# Patient Record
Sex: FEMALE | Race: WHITE | NOT HISPANIC OR LATINO | Employment: OTHER | URBAN - METROPOLITAN AREA
[De-identification: names, ages, dates, MRNs, and addresses within clinical notes are randomized per-mention and may not be internally consistent; named-entity substitution may affect disease eponyms.]

---

## 2017-11-20 LAB — PHQ9 SCORE: 3

## 2017-12-18 ENCOUNTER — TRANSFERRED RECORDS (OUTPATIENT)
Dept: HEALTH INFORMATION MANAGEMENT | Facility: CLINIC | Age: 67
End: 2017-12-18

## 2017-12-22 ENCOUNTER — TRANSFERRED RECORDS (OUTPATIENT)
Dept: HEALTH INFORMATION MANAGEMENT | Facility: CLINIC | Age: 67
End: 2017-12-22

## 2018-01-10 ENCOUNTER — TRANSFERRED RECORDS (OUTPATIENT)
Dept: HEALTH INFORMATION MANAGEMENT | Facility: CLINIC | Age: 68
End: 2018-01-10

## 2018-01-17 ENCOUNTER — TRANSFERRED RECORDS (OUTPATIENT)
Dept: HEALTH INFORMATION MANAGEMENT | Facility: CLINIC | Age: 68
End: 2018-01-17

## 2018-01-30 ENCOUNTER — TRANSFERRED RECORDS (OUTPATIENT)
Dept: HEALTH INFORMATION MANAGEMENT | Facility: CLINIC | Age: 68
End: 2018-01-30

## 2018-02-16 ENCOUNTER — TRANSFERRED RECORDS (OUTPATIENT)
Dept: HEALTH INFORMATION MANAGEMENT | Facility: CLINIC | Age: 68
End: 2018-02-16

## 2018-06-26 LAB
HBA1C MFR BLD: 7.5 % (ref 4–5.6)
PHQ9 SCORE: 2

## 2019-01-22 ENCOUNTER — TRANSFERRED RECORDS (OUTPATIENT)
Dept: HEALTH INFORMATION MANAGEMENT | Facility: CLINIC | Age: 69
End: 2019-01-22

## 2019-01-22 LAB
ALBUMIN (URINE) MG/SPEC: 19 MG/L (ref 7–30)
ALBUMIN/CREATININE RATIO: 10 (ref 0–30)
CHOLEST SERPL-MCNC: 189 MG/DL (ref 25–200)
CREAT SERPL-MCNC: 0.77 MG/DL (ref 0.6–1.2)
CREATININE (URINE): 183 MG/DL (ref 9–259.5)
GFR SERPL CREATININE-BSD FRML MDRD: >60 ML/MIN/1.73M2
GLUCOSE SERPL-MCNC: 163 MG/DL (ref 70–99)
HBA1C MFR BLD: 7.7 % (ref 4–5.6)
HDLC SERPL-MCNC: 47 MG/DL
LDLC SERPL CALC-MCNC: 117 MG/DL (ref 0–100)
NONHDLC SERPL-MCNC: 142 MG/DL (ref 0–130)
PHQ9 SCORE: 0
POTASSIUM SERPL-SCNC: 4.5 MEQ/L (ref 3.5–5.1)
TRIGL SERPL-MCNC: 123 MG/DL (ref 0–150)

## 2019-04-23 ENCOUNTER — TRANSFERRED RECORDS (OUTPATIENT)
Dept: HEALTH INFORMATION MANAGEMENT | Facility: CLINIC | Age: 69
End: 2019-04-23

## 2019-04-23 LAB
CHOLEST SERPL-MCNC: 165 MG/DL (ref 25–200)
HBA1C MFR BLD: 7.4 % (ref 4–5.6)
HDLC SERPL-MCNC: 45 MG/DL
LDLC SERPL CALC-MCNC: 91 MG/DL (ref 0–100)
NONHDLC SERPL-MCNC: 120 MG/DL (ref 0–130)
PHQ9 SCORE: 2
TRIGL SERPL-MCNC: 143 MG/DL (ref 0–150)

## 2019-10-09 ENCOUNTER — OFFICE VISIT (OUTPATIENT)
Dept: FAMILY MEDICINE | Facility: CLINIC | Age: 69
End: 2019-10-09
Payer: COMMERCIAL

## 2019-10-09 VITALS
OXYGEN SATURATION: 92 % | SYSTOLIC BLOOD PRESSURE: 178 MMHG | BODY MASS INDEX: 35.34 KG/M2 | HEART RATE: 112 BPM | DIASTOLIC BLOOD PRESSURE: 98 MMHG | RESPIRATION RATE: 16 BRPM | HEIGHT: 61 IN | TEMPERATURE: 98.7 F | WEIGHT: 187.2 LBS

## 2019-10-09 DIAGNOSIS — Z23 NEED FOR PROPHYLACTIC VACCINATION AND INOCULATION AGAINST INFLUENZA: ICD-10-CM

## 2019-10-09 DIAGNOSIS — E66.01 MORBID OBESITY (H): ICD-10-CM

## 2019-10-09 DIAGNOSIS — E11.9 TYPE 2 DIABETES MELLITUS WITHOUT COMPLICATION, WITHOUT LONG-TERM CURRENT USE OF INSULIN (H): ICD-10-CM

## 2019-10-09 DIAGNOSIS — I10 HYPERTENSION, UNSPECIFIED TYPE: Primary | ICD-10-CM

## 2019-10-09 DIAGNOSIS — E78.5 HYPERLIPIDEMIA LDL GOAL <100: ICD-10-CM

## 2019-10-09 DIAGNOSIS — F33.0 MILD EPISODE OF RECURRENT MAJOR DEPRESSIVE DISORDER (H): ICD-10-CM

## 2019-10-09 DIAGNOSIS — L40.9 PSORIASIS: ICD-10-CM

## 2019-10-09 DIAGNOSIS — J96.01 ACUTE RESPIRATORY FAILURE WITH HYPOXIA (H): ICD-10-CM

## 2019-10-09 DIAGNOSIS — J44.9 CHRONIC OBSTRUCTIVE PULMONARY DISEASE, UNSPECIFIED COPD TYPE (H): ICD-10-CM

## 2019-10-09 LAB
ANION GAP SERPL CALCULATED.3IONS-SCNC: 6 MMOL/L (ref 3–14)
BUN SERPL-MCNC: 17 MG/DL (ref 7–30)
CALCIUM SERPL-MCNC: 9.6 MG/DL (ref 8.5–10.1)
CHLORIDE SERPL-SCNC: 100 MMOL/L (ref 94–109)
CO2 SERPL-SCNC: 31 MMOL/L (ref 20–32)
CREAT SERPL-MCNC: 0.63 MG/DL (ref 0.52–1.04)
CREAT UR-MCNC: 94 MG/DL
GFR SERPL CREATININE-BSD FRML MDRD: >90 ML/MIN/{1.73_M2}
GLUCOSE SERPL-MCNC: 114 MG/DL (ref 70–99)
HBA1C MFR BLD: 7.1 % (ref 0–5.6)
MICROALBUMIN UR-MCNC: 18 MG/L
MICROALBUMIN/CREAT UR: 19.14 MG/G CR (ref 0–25)
POTASSIUM SERPL-SCNC: 3.6 MMOL/L (ref 3.4–5.3)
SODIUM SERPL-SCNC: 137 MMOL/L (ref 133–144)

## 2019-10-09 PROCEDURE — G0008 ADMIN INFLUENZA VIRUS VAC: HCPCS | Performed by: FAMILY MEDICINE

## 2019-10-09 PROCEDURE — 80048 BASIC METABOLIC PNL TOTAL CA: CPT | Performed by: FAMILY MEDICINE

## 2019-10-09 PROCEDURE — 90662 IIV NO PRSV INCREASED AG IM: CPT | Performed by: FAMILY MEDICINE

## 2019-10-09 PROCEDURE — 99203 OFFICE O/P NEW LOW 30 MIN: CPT | Mod: 25 | Performed by: FAMILY MEDICINE

## 2019-10-09 PROCEDURE — 82043 UR ALBUMIN QUANTITATIVE: CPT | Performed by: FAMILY MEDICINE

## 2019-10-09 PROCEDURE — 36415 COLL VENOUS BLD VENIPUNCTURE: CPT | Performed by: FAMILY MEDICINE

## 2019-10-09 PROCEDURE — 83036 HEMOGLOBIN GLYCOSYLATED A1C: CPT | Performed by: FAMILY MEDICINE

## 2019-10-09 RX ORDER — METFORMIN HCL 500 MG
500 TABLET, EXTENDED RELEASE 24 HR ORAL
Qty: 90 TABLET | Refills: 3 | Status: SHIPPED | OUTPATIENT
Start: 2019-10-09 | End: 2020-09-29

## 2019-10-09 RX ORDER — LISINOPRIL 10 MG/1
10 TABLET ORAL DAILY
COMMUNITY
End: 2019-10-09

## 2019-10-09 RX ORDER — NITROGLYCERIN 0.4 MG/1
0.4 TABLET SUBLINGUAL EVERY 5 MIN PRN
COMMUNITY

## 2019-10-09 RX ORDER — ATORVASTATIN CALCIUM 40 MG/1
40 TABLET, FILM COATED ORAL DAILY
COMMUNITY
End: 2019-10-09

## 2019-10-09 RX ORDER — FLUOCINONIDE TOPICAL SOLUTION USP, 0.05% 0.5 MG/ML
SOLUTION TOPICAL DAILY
Qty: 120 ML | Refills: 3 | Status: SHIPPED | OUTPATIENT
Start: 2019-10-09 | End: 2020-12-11

## 2019-10-09 RX ORDER — CHOLECALCIFEROL (VITAMIN D3) 50 MCG
1 TABLET ORAL DAILY
COMMUNITY

## 2019-10-09 RX ORDER — METOPROLOL SUCCINATE 25 MG/1
25 TABLET, EXTENDED RELEASE ORAL DAILY
COMMUNITY
End: 2019-10-09

## 2019-10-09 RX ORDER — DILTIAZEM HYDROCHLORIDE 120 MG/1
120 CAPSULE, COATED, EXTENDED RELEASE ORAL DAILY
Qty: 90 CAPSULE | Refills: 3 | Status: SHIPPED | OUTPATIENT
Start: 2019-10-09 | End: 2020-10-09

## 2019-10-09 RX ORDER — METOPROLOL SUCCINATE 25 MG/1
25 TABLET, EXTENDED RELEASE ORAL DAILY
Qty: 90 TABLET | Refills: 3 | Status: SHIPPED | OUTPATIENT
Start: 2019-10-09 | End: 2020-11-03

## 2019-10-09 RX ORDER — ATORVASTATIN CALCIUM 40 MG/1
40 TABLET, FILM COATED ORAL DAILY
Qty: 90 TABLET | Refills: 3 | Status: SHIPPED | OUTPATIENT
Start: 2019-10-09 | End: 2020-11-17

## 2019-10-09 RX ORDER — LISINOPRIL 10 MG/1
10 TABLET ORAL DAILY
Qty: 90 TABLET | Refills: 3 | Status: SHIPPED | OUTPATIENT
Start: 2019-10-09 | End: 2020-11-03

## 2019-10-09 RX ORDER — ALBUTEROL SULFATE 90 UG/1
2 AEROSOL, METERED RESPIRATORY (INHALATION) EVERY 6 HOURS PRN
COMMUNITY
End: 2019-10-09

## 2019-10-09 RX ORDER — DILTIAZEM HYDROCHLORIDE 120 MG/1
120 CAPSULE, COATED, EXTENDED RELEASE ORAL DAILY
COMMUNITY
End: 2019-10-09

## 2019-10-09 RX ORDER — ALBUTEROL SULFATE 0.83 MG/ML
2.5 SOLUTION RESPIRATORY (INHALATION)
Qty: 360 ML | Refills: 6 | Status: SHIPPED | OUTPATIENT
Start: 2019-10-09 | End: 2020-11-17

## 2019-10-09 RX ORDER — FLUOCINONIDE TOPICAL SOLUTION USP, 0.05% 0.5 MG/ML
SOLUTION TOPICAL
COMMUNITY
End: 2019-10-09

## 2019-10-09 RX ORDER — ALBUTEROL SULFATE 90 UG/1
2 AEROSOL, METERED RESPIRATORY (INHALATION) EVERY 6 HOURS PRN
Qty: 1 INHALER | Refills: 11 | Status: SHIPPED | OUTPATIENT
Start: 2019-10-09 | End: 2020-11-11

## 2019-10-09 ASSESSMENT — MIFFLIN-ST. JEOR: SCORE: 1303.57

## 2019-10-09 NOTE — PATIENT INSTRUCTIONS
Thank you for choosing [facility].  You may be receiving an email and/or telephone survey request from [facility] [employer] regarding your visit today.  Please take a few minutes to respond to the survey to let us know how we are doing.      If you have questions or concerns, please contact us via [employer] or you can contact your care team at [phone].    Our Clinic hours are:  Monday 6:40 am  to 7:00 pm  Tuesday -Friday 6:40 am to 5:00 pm    The [place] outpatient lab hours are:  Monday - Friday 6:10 am to 4:45 pm  Saturdays 7:00 am to 11:00 am  Appointments are required, call [phone]    If you have clinical questions after hours or would like to schedule an appointment,  call the clinic at [phone].

## 2019-10-09 NOTE — PROGRESS NOTES
Subjective     Leena Puente is a 69 year old female who presents to clinic today for the following health issues:    Patient is a 69 yr old female here to [facility]. She has a past medical history that is significant for hypertension, diabetes ,hyperlipidemia obesity, she is transferring her care from [place] to here.denies any acute symptoms and will like to have all her medication refilled.  Chief Complaint   Patient presents with     [facility]     NEW PATIENT TRANSFERRING CARE FROM [facility] ,[facility] \ , RENEW ALL MEDS,  3 months Supply x 1 yr.     Diabetes     DUE FOR ALL LABS, SHE IS FASTING      Imm/Inj     Flu Shot       New Patient/Transfer of Care- Transferring from -  [facility]     Diabetes Follow-up      How often are you checking your blood sugar? One time daily    What time of day are you checking your blood sugars (select all that apply)?  Before meals    Have you had any blood sugars above 200?  No    Have you had any blood sugars below 70?  No    What symptoms do you notice when your blood sugar is low?  Other: sweating and nausea     What concerns do you have today about your diabetes? Other: Weight      Do you have any of these symptoms? (Select all that apply)  Blurry vision and Weight gain - cataracts, Always humgry , gaining weight      Have you had a diabetic eye exam in the last 12 months? Yes- Date of last eye exam: 6 months ago - [facility]     Diabetes Management Resources    Hyperlipidemia Follow-Up      Are you having any of the following symptoms? (Select all that apply)  Shortness of breath    Are you regularly taking any medication or supplement to lower your cholesterol?   Yes- Atorvastatin    Are you having muscle aches or other side effects that you think could be caused by your cholesterol lowering medication?  No    Hypertension Follow-up      Do you check your blood pressure regularly outside of the clinic? Yes     Are you following a low salt diet? No    Are your  blood pressures ever more than 140 on the top number (systolic) OR more   than 90 on the bottom number (diastolic), for example 140/90? Yes    BP Readings from Last 2 Encounters:   10/11/19 132/70   10/09/19 (!) 178/98     Hemoglobin A1C (%)   Date Value   10/09/2019 7.1 (H)   2015 7.0 (H)         How many servings of fruits and vegetables do you eat daily?  2-3    On average, how many sweetened beverages do you drink each day (soda, juice, sweet tea, etc)?   0    How many days per week do you miss taking your medication? 0            Patient Active Problem List   Diagnosis     Acute respiratory disease     Obesity     Tobacco abuse     HTN (hypertension)     Transaminitis     Altered mental status     Acute respiratory failure with hypoxia (H)     Hyperlipidemia     COPD (chronic obstructive pulmonary disease) (H)     Diabetes mellitus, type 2 (H)     Obesity (BMI 35.0-39.9) with comorbidity (H)     History reviewed. No pertinent surgical history.    Social History     Tobacco Use     Smoking status: Former Smoker     Packs/day: 0.00     Last attempt to quit: 10/9/2014     Years since quittin.0     Smokeless tobacco: Never Used   Substance Use Topics     Alcohol use: Not Currently     Family History   Problem Relation Age of Onset     Diabetes Mother      Hypertension Mother      Heart Surgery Mother         QUAD BYPASS     Diabetes Father      Hypertension Father          Current Outpatient Medications   Medication Sig Dispense Refill     albuterol (PROAIR HFA/PROVENTIL HFA/VENTOLIN HFA) 108 (90 Base) MCG/ACT inhaler Inhale 2 puffs into the lungs every 6 hours as needed for shortness of breath / dyspnea or wheezing 1 Inhaler 11     albuterol (PROVENTIL) (2.5 MG/3ML) 0.083% neb solution Take 1 vial (2.5 mg) by nebulization every 2 hours as needed for shortness of breath / dyspnea or other (dyspnea) 360 mL 6     aspirin 81 MG tablet Take 81 mg by mouth daily       atorvastatin (LIPITOR) 40 MG tablet Take 1  tablet (40 mg) by mouth daily 90 tablet 3     Calcium Carbonate Antacid (TUMS ULTRA 1000 PO) Take 1 tablet by mouth daily       Cholecalciferol (VITAMIN D3) 2000 units TABS Take 1 tablet by mouth daily       diltiazem ER COATED BEADS (CARDIZEM CD/CARTIA XT) 120 MG 24 hr capsule Take 1 capsule (120 mg) by mouth daily 90 capsule 3     fluocinonide (LIDEX) 0.05 % external solution Apply topically daily AS NEEDED 120 mL 3     fluticasone-salmeterol (ADVAIR) 500-50 MCG/DOSE inhaler Inhale 1 puff into the lungs 2 times daily 1 Inhaler 11     lisinopril (PRINIVIL/ZESTRIL) 10 MG tablet Take 1 tablet (10 mg) by mouth daily 90 tablet 3     metFORMIN (GLUCOPHAGE-XR) 500 MG 24 hr tablet Take 1 tablet (500 mg) by mouth daily (with dinner) Please fill at patient request 90 tablet 3     metoprolol succinate ER (TOPROL-XL) 25 MG 24 hr tablet Take 1 tablet (25 mg) by mouth daily 90 tablet 3     nitroGLYcerin (NITROSTAT) 0.4 MG sublingual tablet Place 0.4 mg under the tongue every 5 minutes as needed for chest pain For chest pain place 1 tablet under the tongue every 5 minutes for 3 doses. If symptoms persist 5 minutes after 1st dose call 911.       sertraline (ZOLOFT) 50 MG tablet Take 1 tablet (50 mg) by mouth daily 90 tablet 3     umeclidinium (INCRUSE ELLIPTA) 62.5 MCG/INH inhaler Inhale 1 puff into the lungs daily 1 Inhaler 11     Allergies   Allergen Reactions     Latex      HIVES     Sulfa Drugs Hives     BP Readings from Last 3 Encounters:   10/11/19 132/70   10/09/19 (!) 178/98   12/27/16 157/82    Wt Readings from Last 3 Encounters:   10/09/19 84.9 kg (187 lb 3.2 oz)   01/14/15 73 kg (161 lb)                      Reviewed and updated as needed this visit by Provider  Tobacco  Allergies  Meds  Problems  Med Hx  Surg Hx  Fam Hx         Review of Systems   ROS COMP: Constitutional, HEENT, cardiovascular, pulmonary, gi and gu systems are negative, except as otherwise noted.      Objective    BP (!) 178/98 (BP Location:  "Right arm, Patient Position: Chair, Cuff Size: Adult Large)   Pulse 112   Temp 98.7  F (37.1  C) (Tympanic)   Resp 16   Ht 1.537 m (5' 0.5\")   Wt 84.9 kg (187 lb 3.2 oz)   SpO2 92%   Breastfeeding? No   BMI 35.96 kg/m    Body mass index is 35.96 kg/m .  Physical Exam   GENERAL: healthy, alert and no distress  EYES: Eyes grossly normal to inspection, PERRL and conjunctivae and sclerae normal  HENT: ear canals and TM's normal, nose and mouth without ulcers or lesions  NECK: no adenopathy, no asymmetry, masses, or scars and thyroid normal to palpation  RESP: lungs clear to auscultation - no rales, rhonchi or wheezes  CV: regular rate and rhythm, normal S1 S2, no S3 or S4, no murmur, click or rub, no peripheral edema and peripheral pulses strong  ABDOMEN: soft, nontender, no hepatosplenomegaly, no masses and bowel sounds normal  MS: no gross musculoskeletal defects noted, no edema    Diagnostic Test Results:  Labs reviewed in Epic  Results for orders placed or performed in visit on 10/09/19   Hemoglobin A1c   Result Value Ref Range    Hemoglobin A1C 7.1 (H) 0 - 5.6 %   Basic metabolic panel   Result Value Ref Range    Sodium 137 133 - 144 mmol/L    Potassium 3.6 3.4 - 5.3 mmol/L    Chloride 100 94 - 109 mmol/L    Carbon Dioxide 31 20 - 32 mmol/L    Anion Gap 6 3 - 14 mmol/L    Glucose 114 (H) 70 - 99 mg/dL    Urea Nitrogen 17 7 - 30 mg/dL    Creatinine 0.63 0.52 - 1.04 mg/dL    GFR Estimate >90 >60 mL/min/[1.73_m2]    GFR Estimate If Black >90 >60 mL/min/[1.73_m2]    Calcium 9.6 8.5 - 10.1 mg/dL   Albumin Random Urine Quantitative with Creat Ratio   Result Value Ref Range    Creatinine Urine 94 mg/dL    Albumin Urine mg/L 18 mg/L    Albumin Urine mg/g Cr 19.14 0 - 25 mg/g Cr           Assessment & Plan     (I10) Hypertension, unspecified type  (primary encounter diagnosis)  Comment: blood pressure was high today.  Patient will return for a nurse only visit to recheck the blood pressure.   Plan: diltiazem ER " COATED BEADS (CARDIZEM CD/CARTIA         XT) 120 MG 24 hr capsule, lisinopril         (PRINIVIL/ZESTRIL) 10 MG tablet, metoprolol         succinate ER (TOPROL-XL) 25 MG 24 hr tablet       (E11.9) Type 2 diabetes mellitus without complication, without long-term current use of insulin (H)  Comment: A1c is 7.1 . This is a fair in range. Medication refilled.   Plan: Hemoglobin A1c, metFORMIN (GLUCOPHAGE-XR) 500         MG 24 hr tablet, Basic metabolic panel, Albumin        Random Urine Quantitative with Creat Ratio      (L40.9) Psoriasis  Comment: medication refilled.   Plan: fluocinonide (LIDEX) 0.05 % external solution            (F33.0) Mild episode of recurrent major depressive disorder (H)  Comment: Medication refilled  Plan: sertraline (ZOLOFT) 50 MG tablet       (J44.9) Chronic obstructive pulmonary disease, unspecified COPD type (H)  Comment: Medication is refilled.  Plan: albuterol (PROVENTIL) (2.5 MG/3ML) 0.083% neb         solution            (E78.5) Hyperlipidemia LDL goal <100  Comment: Lipids lab ordered .   Plan: atorvastatin (LIPITOR) 40 MG tablet, diltiazem         ER COATED BEADS (CARDIZEM CD/CARTIA XT) 120 MG         24 hr capsule      (E66.01) Morbid obesity (H)  Comment: working on diet and exercise  Plan: As above     (Z23) Need for prophylactic vaccination and inoculation against influenza  Comment:flu shot given   Plan: INFLUENZA (HIGH DOSE) 3 VALENT VACCINE [76618],        ADMIN INFLUENZA (For MEDICARE Patients ONLY)         []          (J96.01) Acute respiratory failure with hypoxia (H)  Comment: resolved   Plan: as above              FUTURE APPOINTMENTS:       - Follow-up visit in one month or sooner as needed.  Patient Instructions         Thank you for choosing [facility].  You may be receiving an email and/or telephone survey request from [employer] regarding your visit today.  Please take a few minutes to respond to the survey to let us know how we are  doing.      If you have questions or concerns, please contact us via [employer] or you can contact your care team at [phone].    Our Clinic hours are:  Monday 6:40 am  to 7:00 pm  Tuesday -Friday 6:40 am to 5:00 pm    The [place] outpatient lab hours are:  Monday - Friday 6:10 am to 4:45 pm  Saturdays 7:00 am to 11:00 am  Appointments are required, call [phone]    If you have clinical questions after hours or would like to schedule an appointment,  call the clinic at [phone].        Return in about 6 months (around 4/9/2020) for Routine Visit.    Pamella Ga MD  [facility]

## 2019-10-10 NOTE — RESULT ENCOUNTER NOTE
Please inform patient that test result was within normal parameters.   Thank you.     Pamella Ga M.D.

## 2019-10-11 ENCOUNTER — TELEPHONE (OUTPATIENT)
Dept: FAMILY MEDICINE | Facility: CLINIC | Age: 69
End: 2019-10-11

## 2019-10-11 ENCOUNTER — ALLIED HEALTH/NURSE VISIT (OUTPATIENT)
Dept: FAMILY MEDICINE | Facility: CLINIC | Age: 69
End: 2019-10-11
Payer: COMMERCIAL

## 2019-10-11 VITALS — DIASTOLIC BLOOD PRESSURE: 70 MMHG | HEART RATE: 105 BPM | SYSTOLIC BLOOD PRESSURE: 132 MMHG

## 2019-10-11 DIAGNOSIS — Z01.30 BP CHECK: Primary | ICD-10-CM

## 2019-10-11 PROBLEM — E66.01 MORBID OBESITY (H): Status: ACTIVE | Noted: 2019-10-11

## 2019-10-11 PROBLEM — E11.9 DIABETES MELLITUS, TYPE 2 (H): Status: ACTIVE | Noted: 2019-10-11

## 2019-10-11 PROBLEM — J44.9 COPD (CHRONIC OBSTRUCTIVE PULMONARY DISEASE) (H): Status: ACTIVE | Noted: 2019-10-11

## 2019-10-11 PROCEDURE — 99207 ZZC NO CHARGE NURSE ONLY: CPT

## 2019-10-11 NOTE — TELEPHONE ENCOUNTER
S-(situation): BP Check    B-(background): 10/09/19 Sury, provider's note not available at time of visit. BP was elevated.  BP Readings from Last 3 Encounters:   10/09/19 (!) 178/98   12/27/16 157/82   01/14/15 95/59     A-(assessment):   Vital Signs 10/11/2019   Systolic 132   Diastolic 70   Pulse 105   Manual.    Patient reports being nervous and has been thinking about having to recheck BP today. First checked with regular sized cuff and BP was 156/74. Discussed need for larger cuff as the regular size was a little too small. Also practiced deep breathing prior to BP Check.     Pharmacy: [facility]    R-(recommendations): Routing to provider to update.      MIRIAM KulkarniN, RN

## 2019-10-11 NOTE — PROGRESS NOTES
S-(situation): BP Check    B-(background): 10/09/19 Sury, provider's note not available at time of visit. BP was elevated.  BP Readings from Last 3 Encounters:   10/09/19 (!) 178/98   12/27/16 157/82   01/14/15 95/59     A-(assessment):   Vital Signs 10/11/2019   Systolic 132   Diastolic 70   Pulse 105   Manual.    Patient reports being nervous and has been thinking about having to recheck BP today. First checked with regular sized cuff and BP was 156/74. Discussed need for larger cuff as the regular size was a little too small. Also practiced deep breathing prior to BP Check.     Pharmacy: [facility]    R-(recommendations): Routing to provider to update.      MIRIAM KulkarniN, RN

## 2019-10-15 PROBLEM — F33.0 MILD EPISODE OF RECURRENT MAJOR DEPRESSIVE DISORDER (H): Status: ACTIVE | Noted: 2019-10-15

## 2020-03-10 ENCOUNTER — HEALTH MAINTENANCE LETTER (OUTPATIENT)
Age: 70
End: 2020-03-10

## 2020-09-29 DIAGNOSIS — E11.9 TYPE 2 DIABETES MELLITUS WITHOUT COMPLICATION, WITHOUT LONG-TERM CURRENT USE OF INSULIN (H): ICD-10-CM

## 2020-09-29 RX ORDER — METFORMIN HCL 500 MG
500 TABLET, EXTENDED RELEASE 24 HR ORAL
Qty: 90 TABLET | Refills: 0 | Status: SHIPPED | OUTPATIENT
Start: 2020-09-29 | End: 2020-11-17

## 2020-10-08 ENCOUNTER — TELEPHONE (OUTPATIENT)
Dept: FAMILY MEDICINE | Facility: CLINIC | Age: 70
End: 2020-10-08

## 2020-10-08 DIAGNOSIS — E78.5 HYPERLIPIDEMIA LDL GOAL <100: ICD-10-CM

## 2020-10-08 DIAGNOSIS — I10 HYPERTENSION, UNSPECIFIED TYPE: ICD-10-CM

## 2020-10-08 NOTE — TELEPHONE ENCOUNTER
Pending Prescriptions:                       Disp   Refills    diltiazem ER COATED BEADS (CARDIZEM CD/CA*90 cap*3            Sig: Take 1 capsule (120 mg) by mouth daily    [facility] - [place], [place] - [address] [place], [place] [address]lila HALL [facility] on 10/8/2020 at 12:03 PM

## 2020-10-08 NOTE — LETTER
[facility]  [address] [address]  Phone: [phone]      October 9, 2020    Leena Puente     [place] [facility] [address]                        Dear Leena Puente    We have received a refill request from your pharmacy, however, we were only able to provide a one time fill because you are due for an Office visit and labs.     Please call [phone]  to schedule an appointment before you are due for your next refill.        Sincerely,    [facility]

## 2020-10-09 RX ORDER — DILTIAZEM HYDROCHLORIDE 120 MG/1
120 CAPSULE, COATED, EXTENDED RELEASE ORAL DAILY
Qty: 30 CAPSULE | Refills: 0 | Status: SHIPPED | OUTPATIENT
Start: 2020-10-09 | End: 2020-11-11

## 2020-10-09 NOTE — TELEPHONE ENCOUNTER
"Requested Prescriptions   Pending Prescriptions Disp Refills     diltiazem ER COATED BEADS (CARDIZEM CD/CARTIA XT) 120 MG 24 hr capsule 90 capsule 3     Sig: Take 1 capsule (120 mg) by mouth daily       Calcium Channel Blockers Protocol  Failed - 10/8/2020 12:03 PM        Failed - Normal ALT in past 12 months     Recent Labs   Lab Test 01/14/15  0656   ALT 46             Passed - Blood pressure under 140/90 in past 12 months     BP Readings from Last 3 Encounters:   10/11/19 132/70   10/09/19 (!) 178/98   12/27/16 157/82                 Passed - Recent (12 mo) or future (30 days) visit within the authorizing provider's specialty     Patient has had an office visit with the authorizing provider or a provider within the authorizing providers department within the previous 12 mos or has a future within next 30 days. See \"Patient Info\" tab in inbasket, or \"Choose Columns\" in Meds & Orders section of the refill encounter.              Passed - Medication is active on med list        Passed - Patient is age 18 or older        Passed - No active pregnancy on record        Passed - Normal serum creatinine on file in past 12 months     Recent Labs   Lab Test 10/09/19  1127   CR 0.63       Ok to refill medication if creatinine is low          Passed - No positive pregnancy test in past 12 months           Labs not current:  ALT    Medication is being filled for 1 time refill only due to:  Patient needs labs ALT. Patient needs to be seen because it has been more than one year since last visit.     Sent message with script to pharmacy.             "

## 2020-11-02 DIAGNOSIS — I10 HYPERTENSION, UNSPECIFIED TYPE: ICD-10-CM

## 2020-11-02 NOTE — TELEPHONE ENCOUNTER
"Requested Prescriptions   Pending Prescriptions Disp Refills     metoprolol succinate ER (TOPROL-XL) 25 MG 24 hr tablet [Pharmacy Med Name: Metoprolol Succinate ER 25 MG Oral Tablet Extended Release 24 Hour] 90 tablet 0     Sig: Take 1 tablet by mouth once daily       Beta-Blockers Protocol Failed - 11/2/2020  9:05 AM        Failed - Blood pressure under 140/90 in past 12 months     BP Readings from Last 3 Encounters:   10/11/19 132/70   10/09/19 (!) 178/98 12/27/16 157/82                 Passed - Patient is age 6 or older        Passed - Recent (12 mo) or future (30 days) visit within the authorizing provider's specialty     Patient has had an office visit with the authorizing provider or a provider within the authorizing providers department within the previous 12 mos or has a future within next 30 days. See \"Patient Info\" tab in inbasket, or \"Choose Columns\" in Meds & Orders section of the refill encounter.              Passed - Medication is active on med list           lisinopril (ZESTRIL) 10 MG tablet [Pharmacy Med Name: Lisinopril 10 MG Oral Tablet] 90 tablet 0     Sig: Take 1 tablet by mouth once daily       ACE Inhibitors (Including Combos) Protocol Failed - 11/2/2020  9:05 AM        Failed - Blood pressure under 140/90 in past 12 months     BP Readings from Last 3 Encounters:   10/11/19 132/70   10/09/19 (!) 178/98   12/27/16 157/82                 Failed - Normal serum creatinine on file in past 12 months     Recent Labs   Lab Test 10/09/19  1127   CR 0.63       Ok to refill medication if creatinine is low          Failed - Normal serum potassium on file in past 12 months     Recent Labs   Lab Test 10/09/19  1127   POTASSIUM 3.6             Passed - Recent (12 mo) or future (30 days) visit within the authorizing provider's specialty     Patient has had an office visit with the authorizing provider or a provider within the authorizing providers department within the previous 12 mos or has a future within " "next 30 days. See \"Patient Info\" tab in inbasket, or \"Choose Columns\" in Meds & Orders section of the refill encounter.              Passed - Medication is active on med list        Passed - Patient is age 18 or older        Passed - No active pregnancy on record        Passed - No positive pregnancy test within past 12 months             "

## 2020-11-02 NOTE — LETTER
[facility]  [address] [address]  [phone]    November 3, 2020    Leena Puente                                                                                                                        [facility] [address]                  Dear Leena,    We have received a refill request from your pharmacy, however, we were only able to provide a one time fill because you are Overdue for Office visit and labs.     Please call [phone] to schedule an appointment before you are due for your next refill.        Sincerely,       Pamella Ga MD

## 2020-11-03 RX ORDER — LISINOPRIL 10 MG/1
TABLET ORAL
Qty: 30 TABLET | Refills: 0 | Status: SHIPPED | OUTPATIENT
Start: 2020-11-03 | End: 2020-11-17

## 2020-11-03 RX ORDER — METOPROLOL SUCCINATE 25 MG/1
TABLET, EXTENDED RELEASE ORAL
Qty: 30 TABLET | Refills: 0 | Status: SHIPPED | OUTPATIENT
Start: 2020-11-03 | End: 2020-11-17

## 2020-11-03 NOTE — TELEPHONE ENCOUNTER
Patient called stating that she only has one day left of medication, requesting refill.     Ree MEHTA  [facility]

## 2020-11-03 NOTE — TELEPHONE ENCOUNTER
Medication is being filled for 1 time refill only due to:  Patient needs labs CMP. Patient needs to be seen because it has been more than one year since last visit.     Sent message with script to pharmacy and mailed letter to home.

## 2020-11-10 DIAGNOSIS — I10 HYPERTENSION, UNSPECIFIED TYPE: ICD-10-CM

## 2020-11-10 DIAGNOSIS — J44.9 CHRONIC OBSTRUCTIVE PULMONARY DISEASE, UNSPECIFIED COPD TYPE (H): Primary | ICD-10-CM

## 2020-11-10 DIAGNOSIS — E78.5 HYPERLIPIDEMIA LDL GOAL <100: ICD-10-CM

## 2020-11-10 NOTE — TELEPHONE ENCOUNTER
"Requested Prescriptions   Pending Prescriptions Disp Refills     VENTOLIN  (90 Base) MCG/ACT inhaler [Pharmacy Med Name: Ventolin  (90 Base) MCG/ACT Inhalation Aerosol Solution] 18 g 0     Sig: INHALE 2 PUFFS BY MOUTH EVERY 6 HOURS AS NEEDED FOR SHORTNESS OF BREATH AND FOR WHEEZING       Asthma Maintenance Inhalers - Anticholinergics Passed - 11/10/2020 12:40 PM        Passed - Patient is age 12 years or older        Passed - Recent (12 mo) or future (30 days) visit within the authorizing provider's specialty     Patient has had an office visit with the authorizing provider or a provider within the authorizing providers department within the previous 12 mos or has a future within next 30 days. See \"Patient Info\" tab in inbasket, or \"Choose Columns\" in Meds & Orders section of the refill encounter.              Passed - Medication is active on med list       Short-Acting Beta Agonist Inhalers Protocol  Passed - 11/10/2020 12:40 PM        Passed - Patient is age 12 or older        Passed - Recent (12 mo) or future (30 days) visit within the authorizing provider's specialty     Patient has had an office visit with the authorizing provider or a provider within the authorizing providers department within the previous 12 mos or has a future within next 30 days. See \"Patient Info\" tab in inbasket, or \"Choose Columns\" in Meds & Orders section of the refill encounter.              Passed - Medication is active on med list           INCRUSE ELLIPTA 62.5 MCG/INH Inhaler [Pharmacy Med Name: Incruse Ellipta 62.5 MCG/INH Inhalation Aerosol Powder Breath Activated]  0     Sig: Inhale 1 puff by mouth once daily       Asthma Maintenance Inhalers - Anticholinergics Passed - 11/10/2020 12:40 PM        Passed - Patient is age 12 years or older        Passed - Recent (12 mo) or future (30 days) visit within the authorizing provider's specialty     Patient has had an office visit with the authorizing provider or a provider " "within the authorizing providers department within the previous 12 mos or has a future within next 30 days. See \"Patient Info\" tab in inbasket, or \"Choose Columns\" in Meds & Orders section of the refill encounter.              Passed - Medication is active on med list             "

## 2020-11-11 RX ORDER — ALBUTEROL SULFATE 90 UG/1
AEROSOL, METERED RESPIRATORY (INHALATION)
Qty: 18 G | Refills: 0 | Status: SHIPPED | OUTPATIENT
Start: 2020-11-11 | End: 2020-11-17

## 2020-11-11 RX ORDER — DILTIAZEM HYDROCHLORIDE 120 MG/1
120 CAPSULE, COATED, EXTENDED RELEASE ORAL DAILY
Qty: 30 CAPSULE | Refills: 0 | Status: SHIPPED | OUTPATIENT
Start: 2020-11-11 | End: 2020-11-17

## 2020-11-12 DIAGNOSIS — J44.9 OBSTRUCTIVE LUNG DISEASE (H): Primary | ICD-10-CM

## 2020-11-16 NOTE — PROGRESS NOTES
SUBJECTIVE:                                                    Leena Puente is 70 year old female   Chief Complaint   Patient presents with     Diabetes     Hypertension     No BP medication this morning.     Lipids     Patient is a 70-year-old female with past medical history significant for hypertension, COPD, morbid obesity, type 2 diabetes, psoriasis.  She comes in today for recheck of her chronic medical conditions.  Her A1c was much elevated compared to the last check.  A1c today is 7.9 as compared to 7.1.  Patient did admit that she had not  been compliant with her diet and she has been doing a lot of stress eating.    Denies any side effects to her medications.  Her blood pressure and heart rate also very high today.  She admitted that she had not taken her medication this morning.  She denies any other acute symptoms today.  She is requesting refills on all her medications.      Diabetes Follow-up    How often are you checking your blood sugar? Two times daily  Blood sugar testing frequency justification:  Patient modifying lifestyle changes (diet, exercise) with blood sugars  What time of day are you checking your blood sugars (select all that apply)?  Before meals  Have you had any blood sugars above 200?  No  Have you had any blood sugars below 70?  No    What symptoms do you notice when your blood sugar is low?  None and agitated, sweaty.    What concerns do you have today about your diabetes? None     Do you have any of these symptoms? (Select all that apply)  No numbness or tingling in feet.  No redness, sores or blisters on feet.  No complaints of excessive thirst.  No reports of blurry vision.  No significant changes to weight.    Have you had a diabetic eye exam in the last 12 months? No          Hyperlipidemia Follow-Up      Are you regularly taking any medication or supplement to lower your cholesterol?   Yes- atorvastatin    Are you having muscle aches or other side effects that you think  could be caused by your cholesterol lowering medication?  No, takes medication at night.    Hypertension Follow-up      Do you check your blood pressure regularly outside of the clinic? Yes     Are you following a low salt diet? Yes    Are your blood pressures ever more than 140 on the top number (systolic) OR more   than 90 on the bottom number (diastolic), for example 140/90? No, 1x only    BP Readings from Last 2 Encounters:   20 (!) 178/92   10/11/19 132/70     Hemoglobin A1C (%)   Date Value   2020 7.9 (H)   10/09/2019 7.1 (H)     LDL Cholesterol Calculated (mg/dL)   Date Value   2019 91   2019 117 (H)         How many servings of fruits and vegetables do you eat daily?  0-1    On average, how many sweetened beverages do you drink each day (Examples: soda, juice, sweet tea, etc.  Do NOT count diet or artificially sweetened beverages)?   Drinks flavored water (ICE)    How many days per week do you exercise enough to make your heart beat faster? 3 or less    How many minutes a day do you exercise enough to make your heart beat faster? 9 or less    How many days per week do you miss taking your medication? 0      Problem list and histories reviewed & adjusted, as indicated.  Additional history: as documented    Patient Active Problem List   Diagnosis     Acute respiratory disease     Obesity     Tobacco abuse     HTN (hypertension)     Transaminitis     Altered mental status     Acute respiratory failure with hypoxia (H)     Hyperlipidemia     COPD (chronic obstructive pulmonary disease) (H)     Diabetes mellitus, type 2 (H)     Obesity (BMI 35.0-39.9) with comorbidity (H)     Mild episode of recurrent major depressive disorder (H)     History reviewed. No pertinent surgical history.    Social History     Tobacco Use     Smoking status: Former Smoker     Packs/day: 0.00     Quit date: 10/9/2014     Years since quittin.1     Smokeless tobacco: Never Used   Substance Use Topics      Alcohol use: Not Currently     Family History   Problem Relation Age of Onset     Diabetes Mother      Hypertension Mother      Heart Surgery Mother         QUAD BYPASS     Diabetes Father      Hypertension Father          Current Outpatient Medications   Medication Sig Dispense Refill     albuterol (PROVENTIL) (2.5 MG/3ML) 0.083% neb solution Take 1 vial (2.5 mg) by nebulization every 2 hours as needed for shortness of breath / dyspnea or other (dyspnea) 360 mL 6     aspirin 81 MG tablet Take 81 mg by mouth daily       atorvastatin (LIPITOR) 40 MG tablet Take 1 tablet (40 mg) by mouth daily 90 tablet 3     Calcium Carbonate Antacid (TUMS ULTRA 1000 PO) Take 1 tablet by mouth daily       Cholecalciferol (VITAMIN D3) 2000 units TABS Take 1 tablet by mouth daily       diltiazem ER COATED BEADS (CARDIZEM CD/CARTIA XT) 120 MG 24 hr capsule Take 1 capsule (120 mg) by mouth daily 90 capsule 3     fluocinonide (LIDEX) 0.05 % external solution Apply topically daily AS NEEDED 120 mL 3     fluticasone-salmeterol (ADVAIR) 500-50 MCG/DOSE inhaler Inhale 1 puff into the lungs 2 times daily 3 Inhaler 3     lisinopril (ZESTRIL) 10 MG tablet Take 1 tablet (10 mg) by mouth daily 90 tablet 3     metFORMIN (GLUCOPHAGE-XR) 500 MG 24 hr tablet Take 2 tabs in the morning and 1 tab in evening. 270 tablet 3     metoprolol succinate ER (TOPROL-XL) 25 MG 24 hr tablet Take 1 tablet (25 mg) by mouth daily 90 tablet 3     sertraline (ZOLOFT) 50 MG tablet Take 1 tablet (50 mg) by mouth daily 90 tablet 3     umeclidinium (INCRUSE ELLIPTA) 62.5 MCG/INH inhaler Inhale 1 puff by mouth once daily 3 each 3     VENTOLIN  (90 Base) MCG/ACT inhaler INHALE 2 PUFFS BY MOUTH EVERY 6 HOURS AS NEEDED FOR SHORTNESS OF BREATH AND FOR WHEEZING 18 g 3     nitroGLYcerin (NITROSTAT) 0.4 MG sublingual tablet Place 0.4 mg under the tongue every 5 minutes as needed for chest pain For chest pain place 1 tablet under the tongue every 5 minutes for 3 doses. If  "symptoms persist 5 minutes after 1st dose call 911.       Allergies   Allergen Reactions     Latex      HIVES     Sulfa Drugs Hives     Recent Labs   Lab Test 11/17/20  0830 10/09/19  1127 04/23/19 01/22/19 01/14/15  0656 01/14/15  0656 01/10/15  0345 01/10/15  0345 01/09/15  0330   A1C 7.9* 7.1* 7.4* 7.7*   < >  --   --   --   --    LDL  --   --  91 117*  --   --   --   --   --    HDL  --   --  45* 47*  --   --   --   --   --    TRIG  --   --  143 123  --   --   --   --  582*   ALT  --   --   --   --   --  46  --  60* 69*   CR  --  0.63  --  0.77  --  0.66   < > 0.52 0.53   GFRESTIMATED  --  >90  --  >60  --  >90  Non  GFR Calc     < > >90  Non  GFR Calc   >90  Non  GFR Calc     GFRESTBLACK  --  >90  --   --   --  >90  African American GFR Calc     < > >90   GFR Calc   >90   GFR Calc     POTASSIUM  --  3.6  --  4.5  --  3.4   < > 4.2 4.1    < > = values in this interval not displayed.      BP Readings from Last 3 Encounters:   11/17/20 (!) 178/92   10/11/19 132/70   10/09/19 (!) 178/98    Wt Readings from Last 3 Encounters:   11/17/20 88.6 kg (195 lb 4 oz)   10/09/19 84.9 kg (187 lb 3.2 oz)   01/14/15 73 kg (161 lb)         ROS:  Constitutional, HEENT, cardiovascular, pulmonary, gi and gu systems are negative, except as otherwise noted.    OBJECTIVE:                                                    BP (!) 178/92   Pulse 117   Temp 99.2  F (37.3  C) (Tympanic)   Resp 18   Ht 1.539 m (5' 0.59\")   Wt 88.6 kg (195 lb 4 oz)   SpO2 95%   BMI 37.39 kg/m    GENERAL APPEARANCE ADULT: Alert, no acute distress, morbidly obese  EYES: PERRL, EOM normal, conjunctiva and lids normal  HENT: Ears and TMs normal, oral mucosa and posterior oropharynx normal  NECK: No adenopathy,masses or thyromegaly  RESP: lungs clear to auscultation   CV: normal rate, regular rhythm, no murmur or gallop, tachycardia   ABDOMEN: soft, no organomegaly, masses or " tenderness  MS: extremities normal, no peripheral edema  Diabetic foot examination - normal DP, pulses normal. Monofilament was normal.    SKIN: no suspicious lesions or rashes  PSYCH: mentation appears normal., affect and mood normal  Diagnostic Test Results:  Results for orders placed or performed in visit on 11/17/20 (from the past 24 hour(s))   **A1C FUTURE anytime   Result Value Ref Range    Hemoglobin A1C 7.9 (H) 0 - 5.6 %        ASSESSMENT/PLAN:                                                    Leena was seen today for diabetes, hypertension and lipids.    Diagnoses and all orders for this visit:    Type 2 diabetes mellitus without complication, without long-term current use of insulin (H)  -     Albumin Random Urine Quantitative with Creat Ratio  -     **A1C FUTURE anytime; Future  -     Basic metabolic panel  (Ca, Cl, CO2, Creat, Gluc, K, Na, BUN)  -     **A1C FUTURE anytime  -     metFORMIN (GLUCOPHAGE-XR) 500 MG 24 hr tablet; Take 2 tabs in the morning and 1 tab in evening.  -     ROUTINE FOOT CARE BY A PHYSICIAN OF A DIABETIC PATIENT WITH DIABETIC SENSORY  -     Medication adjusted. Return in three months for a recheck.        Hyperlipidemia LDL goal <100  -     JUST IN CASE  -     atorvastatin (LIPITOR) 40 MG tablet; Take 1 tablet (40 mg) by mouth daily.  -     Labs ordered. Patient will be notified of results.       Hypertension, unspecified type  -     Basic metabolic panel  (Ca, Cl, CO2, Creat, Gluc, K, Na, BUN)  -     diltiazem ER COATED BEADS (CARDIZEM CD/CARTIA XT) 120 MG 24 hr capsule; Take 1 capsule (120 mg) by mouth daily  -     lisinopril (ZESTRIL) 10 MG tablet; Take 1 tablet (10 mg) by mouth daily  -     metoprolol succinate ER (TOPROL-XL) 25 MG 24 hr tablet; Take 1 tablet (25 mg) by mouth daily.  -   Blood pressure was elevated today. Recommend rechecking in two weeks.       Chronic obstructive pulmonary disease, unspecified COPD type (H)  -     fluticasone-salmeterol (ADVAIR) 500-50  MCG/DOSE inhaler; Inhale 1 puff into the lungs 2 times daily  -     umeclidinium (INCRUSE ELLIPTA) 62.5 MCG/INH inhaler; Inhale 1 puff by mouth once daily  -     VENTOLIN  (90 Base) MCG/ACT inhaler; INHALE 2 PUFFS BY MOUTH EVERY 6 HOURS AS NEEDED FOR SHORTNESS OF BREATH AND FOR WHEEZING  -     albuterol (PROVENTIL) (2.5 MG/3ML) 0.083% neb solution; Take 1 vial (2.5 mg) by nebulization every 2 hours as needed for shortness of breath / dyspnea or other (dyspnea).  -   Stable , no new events . Medication refilled.     Mild episode of recurrent major depressive disorder (H)  -     sertraline (ZOLOFT) 50 MG tablet; Take 1 tablet (50 mg) by mouth daily    Acute respiratory failure with hypoxia (H)        - resolved.     Morbid obesity (H)    Type 2 diabetes mellitus with hyperglycemia, without long-term current use of insulin (H)   -     ROUTINE FOOT CARE BY A PHYSICIAN OF A DIABETIC PATIENT WITH DIABETIC SENSORY        Pamella Ga MD  [facility]

## 2020-11-17 ENCOUNTER — OFFICE VISIT (OUTPATIENT)
Dept: FAMILY MEDICINE | Facility: CLINIC | Age: 70
End: 2020-11-17
Payer: COMMERCIAL

## 2020-11-17 VITALS
TEMPERATURE: 99.2 F | DIASTOLIC BLOOD PRESSURE: 92 MMHG | HEART RATE: 117 BPM | HEIGHT: 61 IN | BODY MASS INDEX: 36.86 KG/M2 | OXYGEN SATURATION: 95 % | RESPIRATION RATE: 18 BRPM | WEIGHT: 195.25 LBS | SYSTOLIC BLOOD PRESSURE: 178 MMHG

## 2020-11-17 DIAGNOSIS — J96.01 ACUTE RESPIRATORY FAILURE WITH HYPOXIA (H): ICD-10-CM

## 2020-11-17 DIAGNOSIS — J44.9 CHRONIC OBSTRUCTIVE PULMONARY DISEASE, UNSPECIFIED COPD TYPE (H): ICD-10-CM

## 2020-11-17 DIAGNOSIS — E11.65 TYPE 2 DIABETES MELLITUS WITH HYPERGLYCEMIA, WITHOUT LONG-TERM CURRENT USE OF INSULIN (H): ICD-10-CM

## 2020-11-17 DIAGNOSIS — E11.9 TYPE 2 DIABETES MELLITUS WITHOUT COMPLICATION, WITHOUT LONG-TERM CURRENT USE OF INSULIN (H): Primary | ICD-10-CM

## 2020-11-17 DIAGNOSIS — F33.0 MILD EPISODE OF RECURRENT MAJOR DEPRESSIVE DISORDER (H): ICD-10-CM

## 2020-11-17 DIAGNOSIS — E78.5 HYPERLIPIDEMIA LDL GOAL <100: ICD-10-CM

## 2020-11-17 DIAGNOSIS — E66.01 MORBID OBESITY (H): ICD-10-CM

## 2020-11-17 DIAGNOSIS — I10 HYPERTENSION, UNSPECIFIED TYPE: ICD-10-CM

## 2020-11-17 LAB
ANION GAP SERPL CALCULATED.3IONS-SCNC: 1 MMOL/L (ref 3–14)
BUN SERPL-MCNC: 14 MG/DL (ref 7–30)
CALCIUM SERPL-MCNC: 9.8 MG/DL (ref 8.5–10.1)
CHLORIDE SERPL-SCNC: 99 MMOL/L (ref 94–109)
CHOLEST SERPL-MCNC: 228 MG/DL
CO2 SERPL-SCNC: 37 MMOL/L (ref 20–32)
CREAT SERPL-MCNC: 0.82 MG/DL (ref 0.52–1.04)
CREAT UR-MCNC: 115 MG/DL
GFR SERPL CREATININE-BSD FRML MDRD: 73 ML/MIN/{1.73_M2}
GLUCOSE SERPL-MCNC: 144 MG/DL (ref 70–99)
HBA1C MFR BLD: 7.9 % (ref 0–5.6)
HDLC SERPL-MCNC: 60 MG/DL
LDLC SERPL CALC-MCNC: 131 MG/DL
MICROALBUMIN UR-MCNC: 11 MG/L
MICROALBUMIN/CREAT UR: 9.65 MG/G CR (ref 0–25)
NONHDLC SERPL-MCNC: 168 MG/DL
POTASSIUM SERPL-SCNC: 4.4 MMOL/L (ref 3.4–5.3)
SODIUM SERPL-SCNC: 137 MMOL/L (ref 133–144)
TRIGL SERPL-MCNC: 185 MG/DL

## 2020-11-17 PROCEDURE — 83036 HEMOGLOBIN GLYCOSYLATED A1C: CPT | Performed by: FAMILY MEDICINE

## 2020-11-17 PROCEDURE — 80061 LIPID PANEL: CPT | Performed by: FAMILY MEDICINE

## 2020-11-17 PROCEDURE — 36415 COLL VENOUS BLD VENIPUNCTURE: CPT | Performed by: FAMILY MEDICINE

## 2020-11-17 PROCEDURE — 82043 UR ALBUMIN QUANTITATIVE: CPT | Performed by: FAMILY MEDICINE

## 2020-11-17 PROCEDURE — 99214 OFFICE O/P EST MOD 30 MIN: CPT | Performed by: FAMILY MEDICINE

## 2020-11-17 PROCEDURE — 80048 BASIC METABOLIC PNL TOTAL CA: CPT | Performed by: FAMILY MEDICINE

## 2020-11-17 RX ORDER — ALBUTEROL SULFATE 0.83 MG/ML
2.5 SOLUTION RESPIRATORY (INHALATION)
Qty: 360 ML | Refills: 6 | Status: SHIPPED | OUTPATIENT
Start: 2020-11-17

## 2020-11-17 RX ORDER — LISINOPRIL 10 MG/1
10 TABLET ORAL DAILY
Qty: 90 TABLET | Refills: 3 | Status: SHIPPED | OUTPATIENT
Start: 2020-11-17

## 2020-11-17 RX ORDER — METOPROLOL SUCCINATE 25 MG/1
25 TABLET, EXTENDED RELEASE ORAL DAILY
Qty: 90 TABLET | Refills: 3 | Status: SHIPPED | OUTPATIENT
Start: 2020-11-17

## 2020-11-17 RX ORDER — METFORMIN HCL 500 MG
TABLET, EXTENDED RELEASE 24 HR ORAL
Qty: 270 TABLET | Refills: 3 | Status: SHIPPED | OUTPATIENT
Start: 2020-11-17

## 2020-11-17 RX ORDER — ATORVASTATIN CALCIUM 40 MG/1
40 TABLET, FILM COATED ORAL DAILY
Qty: 90 TABLET | Refills: 3 | Status: SHIPPED | OUTPATIENT
Start: 2020-11-17

## 2020-11-17 RX ORDER — ALBUTEROL SULFATE 90 UG/1
AEROSOL, METERED RESPIRATORY (INHALATION)
Qty: 18 G | Refills: 3 | Status: SHIPPED | OUTPATIENT
Start: 2020-11-17

## 2020-11-17 RX ORDER — DILTIAZEM HYDROCHLORIDE 120 MG/1
120 CAPSULE, COATED, EXTENDED RELEASE ORAL DAILY
Qty: 90 CAPSULE | Refills: 3 | Status: SHIPPED | OUTPATIENT
Start: 2020-11-17

## 2020-11-17 ASSESSMENT — PATIENT HEALTH QUESTIONNAIRE - PHQ9
SUM OF ALL RESPONSES TO PHQ QUESTIONS 1-9: 8
5. POOR APPETITE OR OVEREATING: SEVERAL DAYS

## 2020-11-17 ASSESSMENT — ANXIETY QUESTIONNAIRES
1. FEELING NERVOUS, ANXIOUS, OR ON EDGE: SEVERAL DAYS
5. BEING SO RESTLESS THAT IT IS HARD TO SIT STILL: SEVERAL DAYS
2. NOT BEING ABLE TO STOP OR CONTROL WORRYING: SEVERAL DAYS
6. BECOMING EASILY ANNOYED OR IRRITABLE: SEVERAL DAYS
GAD7 TOTAL SCORE: 7
3. WORRYING TOO MUCH ABOUT DIFFERENT THINGS: SEVERAL DAYS
7. FEELING AFRAID AS IF SOMETHING AWFUL MIGHT HAPPEN: SEVERAL DAYS

## 2020-11-17 ASSESSMENT — MIFFLIN-ST. JEOR: SCORE: 1336.53

## 2020-11-17 NOTE — LETTER
November 24, 2020      Leena Puente  [address]   [place] [address]        Dear ,    We are writing to inform you of your test results.  Test result was within normal parameters except  that cholesterol was a bit elevated and worse than last check. Recommend continuing with medication and also to work on diet and exercise.     Resulted Orders   Albumin Random Urine Quantitative with Creat Ratio   Result Value Ref Range    Creatinine Urine 115 mg/dL    Albumin Urine mg/L 11 mg/L    Albumin Urine mg/g Cr 9.65 0 - 25 mg/g Cr   Basic metabolic panel  (Ca, Cl, CO2, Creat, Gluc, K, Na, BUN)   Result Value Ref Range    Sodium 137 133 - 144 mmol/L    Potassium 4.4 3.4 - 5.3 mmol/L    Chloride 99 94 - 109 mmol/L    Carbon Dioxide 37 (H) 20 - 32 mmol/L    Anion Gap 1 (L) 3 - 14 mmol/L    Glucose 144 (H) 70 - 99 mg/dL    Urea Nitrogen 14 7 - 30 mg/dL    Creatinine 0.82 0.52 - 1.04 mg/dL    GFR Estimate 73 >60 mL/min/[1.73_m2]      Comment:      Non  GFR Calc  Starting 12/18/2018, serum creatinine based estimated GFR (eGFR) will be   calculated using the Chronic Kidney Disease Epidemiology Collaboration   (CKD-EPI) equation.      GFR Estimate If Black 84 >60 mL/min/[1.73_m2]      Comment:       GFR Calc  Starting 12/18/2018, serum creatinine based estimated GFR (eGFR) will be   calculated using the Chronic Kidney Disease Epidemiology Collaboration   (CKD-EPI) equation.      Calcium 9.8 8.5 - 10.1 mg/dL   **A1C FUTURE anytime   Result Value Ref Range    Hemoglobin A1C 7.9 (H) 0 - 5.6 %      Comment:      Normal <5.7% Prediabetes 5.7-6.4%  Diabetes 6.5% or higher - adopted from ADA   consensus guidelines.     Lipid panel reflex to direct LDL Fasting   Result Value Ref Range    Cholesterol 228 (H) <200 mg/dL      Comment:      Desirable:       <200 mg/dl    Triglycerides 185 (H) <150 mg/dL      Comment:      Borderline high:  150-199 mg/dl  High:             200-499 mg/dl  Very high:        >499 mg/dl  Fasting specimen      HDL Cholesterol 60 >49 mg/dL    LDL Cholesterol Calculated 131 (H) <100 mg/dL      Comment:      Above desirable:  100-129 mg/dl  Borderline High:  130-159 mg/dL  High:             160-189 mg/dL  Very high:       >189 mg/dl      Non HDL Cholesterol 168 (H) <130 mg/dL      Comment:      Above Desirable:  130-159 mg/dl  Borderline high:  160-189 mg/dl  High:             190-219 mg/dl  Very high:       >219 mg/dl         If you have any questions or concerns, please call the clinic at the number listed above.       Sincerely,        Pamella Ga MD/bmc

## 2020-11-18 ASSESSMENT — ANXIETY QUESTIONNAIRES: GAD7 TOTAL SCORE: 7

## 2020-11-24 NOTE — RESULT ENCOUNTER NOTE
Please inform patient that test result was within normal parameters except  that her cholesterol was a bit elevated and worse than last check. Recommend that she continue with her medication and also to work on diet and exercise.   Thank you.     Pamella Ga M.D.

## 2020-12-01 ENCOUNTER — TELEPHONE (OUTPATIENT)
Dept: FAMILY MEDICINE | Facility: CLINIC | Age: 70
End: 2020-12-01

## 2020-12-01 ENCOUNTER — ALLIED HEALTH/NURSE VISIT (OUTPATIENT)
Dept: FAMILY MEDICINE | Facility: CLINIC | Age: 70
End: 2020-12-01
Payer: COMMERCIAL

## 2020-12-01 VITALS — DIASTOLIC BLOOD PRESSURE: 72 MMHG | HEART RATE: 84 BPM | SYSTOLIC BLOOD PRESSURE: 138 MMHG

## 2020-12-01 DIAGNOSIS — I10 HYPERTENSION, UNSPECIFIED TYPE: Primary | ICD-10-CM

## 2020-12-01 PROCEDURE — 99207 PR NO CHARGE NURSE ONLY: CPT

## 2020-12-01 NOTE — NURSING NOTE
Leena Puente is a 70 year old year old patient who comes in today for a Blood Pressure check because of elevated reading at last visit on 11/17/20.  Pt says that she had forgotten to take her medication that day.    Vital Signs as repeated by RN:  140/73,  Pulse 92  138/72, pulse 84, rechecked 5 min later.    Patient is taking medication as prescribed  Patient is tolerating medications well.  Patient is monitoring Blood Pressure at home.  Average readings are upper 130's/65-70.    Current complaints: none  Disposition:  Routed updated reading to provider.    Jennifer Kam RN

## 2020-12-02 NOTE — TELEPHONE ENCOUNTER
Message left for patient to return call to clinic.  CSS - ok to deliver message below.    Alicia HARRISON MSN, RN

## 2021-01-08 ENCOUNTER — TELEPHONE (OUTPATIENT)
Dept: FAMILY MEDICINE | Facility: CLINIC | Age: 71
End: 2021-01-08

## 2021-01-08 DIAGNOSIS — J44.9 CHRONIC OBSTRUCTIVE PULMONARY DISEASE, UNSPECIFIED COPD TYPE (H): ICD-10-CM

## 2021-01-08 NOTE — TELEPHONE ENCOUNTER
Patient is calling asking for a new tiotropium (SPIRIVA) inhalation capsule 18 mcg medication to be sent to pharmacy [facility] in [place] that umeclidinium (INCRUSE ELLIPTA) 62.5 MCG/INH inhaler   IS NOT covered.    Sharonda Gomes on 1/8/2021 at 3:13 PM

## 2021-01-13 RX ORDER — TIOTROPIUM BROMIDE 18 UG/1
18 CAPSULE ORAL; RESPIRATORY (INHALATION) DAILY
Qty: 30 CAPSULE | Refills: 11 | Status: SHIPPED | OUTPATIENT
Start: 2021-01-13 | End: 2021-04-01

## 2021-01-13 NOTE — TELEPHONE ENCOUNTER
Insurance will not cover Incruse Ellipta but will cover Spiriva with Handihaler capsules. Patient needs Spiriva, NOT INCRUSE. Wrong med was sent in on 1/11/2021. Nedra Hannon on 1/13/2021 at 2:54 PM

## 2021-02-28 ENCOUNTER — HEALTH MAINTENANCE LETTER (OUTPATIENT)
Age: 71
End: 2021-02-28

## 2021-04-01 DIAGNOSIS — J44.9 CHRONIC OBSTRUCTIVE PULMONARY DISEASE, UNSPECIFIED COPD TYPE (H): ICD-10-CM

## 2021-04-01 RX ORDER — TIOTROPIUM BROMIDE 18 UG/1
18 CAPSULE ORAL; RESPIRATORY (INHALATION) DAILY
Qty: 30 CAPSULE | Refills: 11 | Status: SHIPPED | OUTPATIENT
Start: 2021-04-01

## 2021-04-01 NOTE — TELEPHONE ENCOUNTER
"Requested Prescriptions   Pending Prescriptions Disp Refills     fluticasone-salmeterol (ADVAIR) 500-50 MCG/DOSE inhaler       Sig: Inhale 1 puff into the lungs 2 times daily       Long-Acting Beta Agonist Inhalers Protocol  Failed - 4/1/2021  7:49 AM        Failed - Order for Serevent, Striverdi, or Foradil and pt has steroid inhaler        Passed - Patient is age 12 or older        Passed - Recent (12 mo) or future (30 days) visit within the authorizing provider's specialty     Patient has had an office visit with the authorizing provider or a provider within the authorizing providers department within the previous 12 mos or has a future within next 30 days. See \"Patient Info\" tab in inbasket, or \"Choose Columns\" in Meds & Orders section of the refill encounter.              Passed - Medication is active on med list       Inhaled Steroids Protocol Passed - 4/1/2021  7:49 AM        Passed - Patient is age 12 or older        Passed - Recent (12 mo) or future (30 days) visit within the authorizing provider's specialty     Patient has had an office visit with the authorizing provider or a provider within the authorizing providers department within the previous 12 mos or has a future within next 30 days. See \"Patient Info\" tab in inbasket, or \"Choose Columns\" in Meds & Orders section of the refill encounter.              Passed - Medication is active on med list           tiotropium (SPIRIVA) 18 MCG inhaled capsule 30 capsule 11     Sig: Inhale 1 capsule (18 mcg) into the lungs daily       Asthma Maintenance Inhalers - Anticholinergics Passed - 4/1/2021  7:49 AM        Passed - Patient is age 12 years or older        Passed - Recent (12 mo) or future (30 days) visit within the authorizing provider's specialty     Patient has had an office visit with the authorizing provider or a provider within the authorizing providers department within the previous 12 mos or has a future within next 30 days. See \"Patient Info\" tab in " "inbasket, or \"Choose Columns\" in Meds & Orders section of the refill encounter.              Passed - Medication is active on med list       Inhaled Steroids Protocol Passed - 4/1/2021  7:49 AM        Passed - Patient is age 12 or older        Passed - Recent (12 mo) or future (30 days) visit within the authorizing provider's specialty     Patient has had an office visit with the authorizing provider or a provider within the authorizing providers department within the previous 12 mos or has a future within next 30 days. See \"Patient Info\" tab in inbasket, or \"Choose Columns\" in Meds & Orders section of the refill encounter.              Passed - Medication is active on med list             "

## 2021-04-24 ENCOUNTER — HEALTH MAINTENANCE LETTER (OUTPATIENT)
Age: 71
End: 2021-04-24

## 2021-06-13 ENCOUNTER — HEALTH MAINTENANCE LETTER (OUTPATIENT)
Age: 71
End: 2021-06-13

## 2021-10-03 ENCOUNTER — HEALTH MAINTENANCE LETTER (OUTPATIENT)
Age: 71
End: 2021-10-03

## 2022-03-20 ENCOUNTER — HEALTH MAINTENANCE LETTER (OUTPATIENT)
Age: 72
End: 2022-03-20

## 2022-05-15 ENCOUNTER — HEALTH MAINTENANCE LETTER (OUTPATIENT)
Age: 72
End: 2022-05-15

## 2022-09-11 ENCOUNTER — HEALTH MAINTENANCE LETTER (OUTPATIENT)
Age: 72
End: 2022-09-11

## 2023-01-22 ENCOUNTER — HEALTH MAINTENANCE LETTER (OUTPATIENT)
Age: 73
End: 2023-01-22

## 2023-04-30 ENCOUNTER — HEALTH MAINTENANCE LETTER (OUTPATIENT)
Age: 73
End: 2023-04-30

## 2023-10-01 ENCOUNTER — HEALTH MAINTENANCE LETTER (OUTPATIENT)
Age: 73
End: 2023-10-01

## 2023-10-26 ENCOUNTER — TELEPHONE (OUTPATIENT)
Dept: FAMILY MEDICINE | Facility: CLINIC | Age: 73
End: 2023-10-26
Payer: COMMERCIAL

## 2023-10-26 ENCOUNTER — NURSE TRIAGE (OUTPATIENT)
Dept: NURSING | Facility: CLINIC | Age: 73
End: 2023-10-26
Payer: COMMERCIAL

## 2023-10-26 NOTE — TELEPHONE ENCOUNTER
Dr Ga  Pt last seen by you 11-17-20  Pt now doctors at [facility]  Pt has picc line and needs dressing changes 1x per wk.     Currently they have to drive 3 hours for dressing change so [facility] provider sent order for dressing change to our infusion center but they were denied as it is not our provider ordering.     Daughter Alma wondering if you could place order with infusion center for a once weekly picc dressing change.          [phone]            Azael Garcia, RN

## 2023-10-26 NOTE — TELEPHONE ENCOUNTER
Nurse Triage SBAR    Is this a 2nd Level Triage? No - Patient is not currently established with [facility]    Situation/Background: Patient's daughter, Alma, is calling regarding the patient needing a PICC line dressing change. The patient has been receiving home infusions, is completing her final infusion at the time of the call. Daughter has the PICC dressing but has not been taught how to perform the dressing change. Daughter stated an order was sent to [facility] today but was declined and sent back due; note also stated that they do not have appointments available today and may not have availability tomorrow.     Dr. Arauz at [facility] is Provider who ordered the PICC dressing change.     [facility] not file; verbal permission to speak with daughter obtained from the patient.   Daughter states there are no concerns with Picc line needing triage. Daughter is requesting advice on where to get the Picc line dressing changed.     Assessment:   Patient will have the PICC line removed, likely within the next week.   Patient does not have a Home Health nurse    Recommendation: Per disposition, Information provided. Daughter was advised to bring the patient to UC, if they are not able to change the dressing, go to ED. Bring the sterile dressing kit with to UC/ED. In the event that the PICC line will remain in placed, this writer provided several options to daughter. Recommended daughter ask [facility] to call [facility] to see if they are able to accept the order. Advised daughter to call [facility] to determine is there was a RN who could walk the daughter through the dressing change procedure. Advised daughter to call back with any new or worsening symptoms. Daughter verbalized understanding and agrees with plan.    Protocol Recommended Disposition: Telephone advice    Nini Bender RN on 10/26/2023 at 2:06 PM  [facility]  Reason for Disposition   [1] Dressing needs to be changed  (e.g., wet, soiled, loose, or open to air) AND [2] unable or unwilling to perform dressing change    Protocols used: IV Site and Other Symptoms-A-AH

## 2023-10-27 ENCOUNTER — HOSPITAL ENCOUNTER (EMERGENCY)
Facility: CLINIC | Age: 73
Discharge: HOME | End: 2023-10-27
Attending: EMERGENCY MEDICINE | Admitting: EMERGENCY MEDICINE
Payer: COMMERCIAL

## 2023-10-27 VITALS
SYSTOLIC BLOOD PRESSURE: 168 MMHG | RESPIRATION RATE: 16 BRPM | DIASTOLIC BLOOD PRESSURE: 82 MMHG | OXYGEN SATURATION: 95 % | HEIGHT: 62 IN | BODY MASS INDEX: 31.28 KG/M2 | TEMPERATURE: 99 F | HEART RATE: 89 BPM | WEIGHT: 170 LBS

## 2023-10-27 DIAGNOSIS — Z48.00 DRESSING CHANGE OR REMOVAL, NONSURGICAL WOUND: ICD-10-CM

## 2023-10-27 PROCEDURE — 99282 EMERGENCY DEPT VISIT SF MDM: CPT | Performed by: EMERGENCY MEDICINE

## 2023-10-27 NOTE — ED NOTES
Pt here visiting daughter, unable to get to clinic to get PICC dressing changed. PICC line dressing changed per protocol. Pt discharged, no other concerns with PICC line.

## 2023-10-27 NOTE — TELEPHONE ENCOUNTER
Is she getting infusions here in [place]? I am very confused about this request. Am being asked to give orders for PICC line dressing changes here in [place]? Is there an EPIC order for this?

## 2023-10-27 NOTE — ED TRIAGE NOTES
Here for PICC line dressing change     Triage Assessment (Adult)       Row Name 10/27/23 0933          Triage Assessment    Airway WDL WDL        Respiratory WDL    Respiratory WDL WDL        Skin Circulation/Temperature WDL    Skin Circulation/Temperature WDL WDL        Cardiac WDL    Cardiac WDL WDL        Peripheral/Neurovascular WDL    Peripheral Neurovascular WDL WDL        Cognitive/Neuro/Behavioral WDL    Cognitive/Neuro/Behavioral WDL WDL

## 2023-10-28 ASSESSMENT — ENCOUNTER SYMPTOMS
FEVER: 0
COLOR CHANGE: 0
NAUSEA: 0
SHORTNESS OF BREATH: 0
VOMITING: 0
CHILLS: 0

## 2023-10-28 NOTE — ED PROVIDER NOTES
History     Chief Complaint   Patient presents with     Vascular Access Problem     Nurse Visit     HPI  Leena Puente is a 73 year old female who has a PICC line for IV antibiotics.  She is on requiring IV antibiotics, which is awaiting recommendations from CDC for duration of therapy as she has a rare organism which was found.  She reports that the PICC line has been functioning well.  She has no discomfort at the site and redness, swelling, or warmth.  She denies fever and denies nausea or vomiting.    Allergies:  Allergies   Allergen Reactions     Latex      HIVES     Shellfish Allergy Itching and Other (See Comments)     Sulfa Antibiotics Hives       Problem List:    Patient Active Problem List    Diagnosis Date Noted     Mild episode of recurrent major depressive disorder (H24) 10/15/2019     Priority: Medium     COPD (chronic obstructive pulmonary disease) (H) 10/11/2019     Priority: Medium     Diabetes mellitus, type 2 (H) 10/11/2019     Priority: Medium     Obesity (BMI 35.0-39.9) with comorbidity (H) 10/11/2019     Priority: Medium     Obesity 01/09/2015     Priority: Medium     Problem list name updated by automated process. Provider to review       Tobacco abuse 01/09/2015     Priority: Medium     HTN (hypertension) 01/09/2015     Priority: Medium     Transaminitis 01/09/2015     Priority: Medium     Altered mental status 01/09/2015     Priority: Medium     Acute respiratory failure with hypoxia (H) 01/09/2015     Priority: Medium     Hyperlipidemia 01/09/2015     Priority: Medium     Diagnosis updated by automated process. Provider to review and confirm.       Acute respiratory disease 01/05/2015     Priority: Medium        Past Medical History:    Past Medical History:   Diagnosis Date     HLD (hyperlipidaemia) 1/9/2015     HTN (hypertension) 1/9/2015     Obesity, unspecified 1/9/2015     Tobacco abuse 1/9/2015       Past Surgical History:    No past surgical history on file.    Family History:   "  Family History   Problem Relation Age of Onset     Diabetes Mother      Hypertension Mother      Heart Surgery Mother         QUAD BYPASS     Diabetes Father      Hypertension Father        Social History:  Marital Status:  Single [1]  Social History     Tobacco Use     Smoking status: Former     Packs/day: 0     Types: Cigarettes     Quit date: 10/9/2014     Years since quittin.0     Smokeless tobacco: Never   Substance Use Topics     Alcohol use: Not Currently     Drug use: Never        Medications:    albuterol (PROVENTIL) (2.5 MG/3ML) 0.083% neb solution  aspirin 81 MG tablet  atorvastatin (LIPITOR) 40 MG tablet  Calcium Carbonate Antacid (TUMS ULTRA 1000 PO)  Cholecalciferol (VITAMIN D3) 2000 units TABS  diltiazem ER COATED BEADS (CARDIZEM CD/CARTIA XT) 120 MG 24 hr capsule  fluocinonide (LIDEX) 0.05 % external solution  fluticasone-salmeterol (ADVAIR) 500-50 MCG/DOSE inhaler  lisinopril (ZESTRIL) 10 MG tablet  metFORMIN (GLUCOPHAGE-XR) 500 MG 24 hr tablet  metoprolol succinate ER (TOPROL-XL) 25 MG 24 hr tablet  nitroGLYcerin (NITROSTAT) 0.4 MG sublingual tablet  sertraline (ZOLOFT) 50 MG tablet  tiotropium (SPIRIVA) 18 MCG inhaled capsule  umeclidinium (INCRUSE ELLIPTA) 62.5 MCG/INH inhaler  VENTOLIN  (90 Base) MCG/ACT inhaler          Review of Systems   Constitutional: Negative for chills and fever.   Respiratory: Negative for shortness of breath.    Cardiovascular: Negative for chest pain.   Gastrointestinal: Negative for nausea and vomiting.   Skin: Negative for color change.       Physical Exam   BP: (!) 168/82  Pulse: 89  Temp: 99  F (37.2  C)  Resp: 16  Height: 157.5 cm (5' 2\")  Weight: 77.1 kg (170 lb)  SpO2: 95 %      Physical Exam  Constitutional:       General: She is not in acute distress.     Appearance: She is well-developed.   HENT:      Head: Normocephalic and atraumatic.   Eyes:      Conjunctiva/sclera: Conjunctivae normal.      Pupils: Pupils are equal, round, and reactive to " light.   Neck:      Thyroid: No thyromegaly.      Trachea: No tracheal deviation.   Cardiovascular:      Rate and Rhythm: Normal rate and regular rhythm.      Heart sounds: Normal heart sounds. No murmur heard.  Pulmonary:      Effort: Pulmonary effort is normal. No respiratory distress.      Breath sounds: Normal breath sounds. No wheezing.   Chest:      Chest wall: No tenderness.   Abdominal:      General: There is no distension.      Palpations: Abdomen is soft.      Tenderness: There is no abdominal tenderness.   Musculoskeletal:         General: No tenderness.      Cervical back: Normal range of motion and neck supple.      Comments: Right upper arm PICC line is in place.  There is no tenderness, swelling, or warmth at the insertion site.   Skin:     General: Skin is warm.      Findings: No rash.   Neurological:      Mental Status: She is alert and oriented to person, place, and time.      Sensory: No sensory deficit.   Psychiatric:         Behavior: Behavior normal.         ED Course             Procedures                No results found for this or any previous visit (from the past 24 hour(s)).    Medications - No data to display    Assessments & Plan (with Medical Decision Making)     I have reviewed the nursing notes.    I have reviewed the findings, diagnosis, plan and need for follow up with the patient.  Patient presents to the ED for PICC line dressing change.  It has been functioning normally.  There is no evidence of infection or systemic complaints.  Her PICC line was dressed per nursing protocol.      Medical Decision Making  The patient's presentation was of straightforward complexity (a clearly self-limited or minor problem).    The patient's evaluation involved:  history and exam without other MDM data elements    The patient's management necessitated only low risk treatment.        Discharge Medication List as of 10/27/2023  9:48 AM          Final diagnoses:   Dressing change or removal,  nonsurgical wound       10/27/2023   [facility] EMERGENCY DEPT     Oseas Sanchez MD  10/28/23 [id number]

## 2023-10-30 NOTE — TELEPHONE ENCOUNTER
Attempt to call daughter in regards to orders requested. Need more information on orders.    Lata Nash, RN

## 2024-02-18 ENCOUNTER — HEALTH MAINTENANCE LETTER (OUTPATIENT)
Age: 74
End: 2024-02-18

## 2024-07-07 ENCOUNTER — HEALTH MAINTENANCE LETTER (OUTPATIENT)
Age: 74
End: 2024-07-07

## 2024-11-24 ENCOUNTER — HEALTH MAINTENANCE LETTER (OUTPATIENT)
Age: 74
End: 2024-11-24

## 2025-03-09 ENCOUNTER — HEALTH MAINTENANCE LETTER (OUTPATIENT)
Age: 75
End: 2025-03-09

## 2025-06-28 ENCOUNTER — HEALTH MAINTENANCE LETTER (OUTPATIENT)
Age: 75
End: 2025-06-28